# Patient Record
Sex: FEMALE | Race: BLACK OR AFRICAN AMERICAN | NOT HISPANIC OR LATINO
[De-identification: names, ages, dates, MRNs, and addresses within clinical notes are randomized per-mention and may not be internally consistent; named-entity substitution may affect disease eponyms.]

---

## 2019-05-22 PROBLEM — Z00.00 ENCOUNTER FOR PREVENTIVE HEALTH EXAMINATION: Status: ACTIVE | Noted: 2019-05-22

## 2019-06-07 ENCOUNTER — OTHER (OUTPATIENT)
Age: 59
End: 2019-06-07

## 2019-06-13 ENCOUNTER — APPOINTMENT (OUTPATIENT)
Dept: ORTHOPEDIC SURGERY | Facility: CLINIC | Age: 59
End: 2019-06-13
Payer: COMMERCIAL

## 2019-06-13 VITALS
SYSTOLIC BLOOD PRESSURE: 156 MMHG | DIASTOLIC BLOOD PRESSURE: 87 MMHG | WEIGHT: 260 LBS | BODY MASS INDEX: 44.39 KG/M2 | HEIGHT: 64 IN | HEART RATE: 61 BPM

## 2019-06-13 DIAGNOSIS — Z78.9 OTHER SPECIFIED HEALTH STATUS: ICD-10-CM

## 2019-06-13 DIAGNOSIS — Z87.39 PERSONAL HISTORY OF OTHER DISEASES OF THE MUSCULOSKELETAL SYSTEM AND CONNECTIVE TISSUE: ICD-10-CM

## 2019-06-13 PROCEDURE — 73564 X-RAY EXAM KNEE 4 OR MORE: CPT

## 2019-06-13 PROCEDURE — 73502 X-RAY EXAM HIP UNI 2-3 VIEWS: CPT

## 2019-06-13 PROCEDURE — 99204 OFFICE O/P NEW MOD 45 MIN: CPT

## 2019-06-13 PROCEDURE — 99203 OFFICE O/P NEW LOW 30 MIN: CPT

## 2019-06-13 NOTE — DISCUSSION/SUMMARY
[de-identified] : The patient is a 59 year old female with moderate arthritis of the left hip and bone on bone arthritis of the left knee. Conservative options were discussed. Visco Supplementation was ordered. She may be a future candidate for left knee replacement and ultimately left hip replacement, but wishes to continue with conservative treatment. She will follow up for Visco Supplementation.\par \par A discussion was had with the patient regarding a left total knee replacement. A long discussion was had with the patient as what the total joint replacement would entail. A model was used to demonstrate the operation and to discuss bearing surfaces of the implants. The hospitalization and rehabilitation were discussed.  The use of perioperative antibiotics and DVT prophylaxis were discussed. The risks, benefits and alternatives to surgical intervention were discussed at length with the patient. Specific risks discussed included: infection, wound breakdown, numbness and damage to nerves, tendon, muscle, arteries or other blood vessels. The possibility of recurrent pain, no improvement in pain and actual worsening of the pain were also mentioned in conversation with the patient. Medical complications related to the patient's general medical health including deep vein thrombosis, pulmonary embolus, heart attack, stroke, death and other complications from anesthesia were discussed as well. The patient was told that we will take steps to minimize these risks by using sterile technique, antibiotics and DVT prophylaxis when appropriate and following the patient postoperatively in the clinic setting to monitor progress. The benefits of surgery were discussed with the patient including the potential to improve the current clinical condition through operative intervention. Alternatives to surgical intervention include continued conservative management which may yield less than optimal results in this particular patient. All questions were answered to the satisfaction of the patient.\par

## 2019-06-13 NOTE — HISTORY OF PRESENT ILLNESS
[de-identified] : This 59-year-old female presents for evaluation of her left hip and knee. She denies overt injury or trauma. She reports moving in 2017.   After the move she became aware of pain to her left hip and knees. She was originally seen by another orthopedist. She was diagnosed with osteoarthritis. In May of 2018 she had bilateral knee Visco supplementation with good relief for approximately 1 year. In addition she had an intra-articular cortisone injection to the left hip which also offered prolonged relief for approximately 1 year. At this time she is noting left hip pain. Pain is most notable over the posterior lateral aspect of the hip. She is denying groin pain at this time. She does have difficulty donning/doffing shoes and socks. Symptoms are worse in inclement and cold weather. She also notes bilateral knee pain, left greater than right. Pain is constant. It is worse with weightbearing activities. She is limping. She was seen by her primary medical doctor and given naproxen. She also takes arthritis strength Tylenol.

## 2019-06-13 NOTE — PHYSICAL EXAM
[de-identified] : The patient appears well nourished  and in no apparent distress.  The patient is alert and oriented to person, place, and time.   Affect and mood appear normal. The head is normocephalic and atraumatic.  The eyes reveal normal sclera and extra ocular muscles are intact. The tongue is midline with no apparent lesions.  Skin shows normal turgor with no evidence of eczema or psoriasis.  No respiratory distress noted.  Sensation grossly intact.\par   [de-identified] : Exam of the left knee shows -5 to 90 degrees of flexion with pain and stiffness.\par Exam of the left hip shows external rotation of 50 degrees, internal rotation is -10 degrees, left hip pain with ROM. 5/5 motor strength bilaterally distally. Sensation intact distally.  [de-identified] : Xray- AP pelvis and 2 views left hip shows moderate arthritis of the left hip.\par \par Xray- 4 views of the left knee shows bone on bone arthritis of the left knee.

## 2019-06-13 NOTE — CONSULT LETTER
[Consult Letter:] : I had the pleasure of evaluating your patient, [unfilled]. [Dear  ___] : Dear  [unfilled], [Please see my note below.] : Please see my note below. [Consult Closing:] : Thank you very much for allowing me to participate in the care of this patient.  If you have any questions, please do not hesitate to contact me. [Sincerely,] : Sincerely, [FreeTextEntry2] : ENRIQUE JOSEPH MD\par  [FreeTextEntry3] : Justo Bobo MD\par Chief of Joint Replacement\par [facility] \par [facility] [place]\par \par

## 2019-06-13 NOTE — ADDENDUM
[FreeTextEntry1] : This note was authored by Emil Desai working as a medical scribe for Dr. Justo Bobo. The note was reviewed, edited, and revised by Dr. Justo Bobo whom is in agreement with the exam findings, imaging findings, and treatment plan. 06/13/2019.

## 2019-07-25 ENCOUNTER — OTHER (OUTPATIENT)
Age: 59
End: 2019-07-25

## 2019-07-31 ENCOUNTER — OTHER (OUTPATIENT)
Age: 59
End: 2019-07-31

## 2019-08-09 ENCOUNTER — OTHER (OUTPATIENT)
Age: 59
End: 2019-08-09

## 2019-08-14 ENCOUNTER — RX RENEWAL (OUTPATIENT)
Age: 59
End: 2019-08-14

## 2019-08-15 ENCOUNTER — OTHER (OUTPATIENT)
Age: 59
End: 2019-08-15

## 2019-08-15 ENCOUNTER — RX RENEWAL (OUTPATIENT)
Age: 59
End: 2019-08-15

## 2019-08-26 ENCOUNTER — FORM ENCOUNTER (OUTPATIENT)
Age: 59
End: 2019-08-26

## 2019-08-27 ENCOUNTER — APPOINTMENT (OUTPATIENT)
Dept: ULTRASOUND IMAGING | Facility: CLINIC | Age: 59
End: 2019-08-27

## 2019-08-27 ENCOUNTER — OUTPATIENT (OUTPATIENT)
Dept: OUTPATIENT SERVICES | Facility: HOSPITAL | Age: 59
LOS: 1 days | End: 2019-08-27

## 2019-08-27 ENCOUNTER — APPOINTMENT (OUTPATIENT)
Dept: ULTRASOUND IMAGING | Facility: CLINIC | Age: 59
End: 2019-08-27
Payer: COMMERCIAL

## 2019-08-27 PROCEDURE — 20611 DRAIN/INJ JOINT/BURSA W/US: CPT | Mod: LT

## 2020-03-26 ENCOUNTER — APPOINTMENT (OUTPATIENT)
Dept: ORTHOPEDIC SURGERY | Facility: CLINIC | Age: 60
End: 2020-03-26

## 2020-04-23 ENCOUNTER — APPOINTMENT (OUTPATIENT)
Dept: ORTHOPEDIC SURGERY | Facility: CLINIC | Age: 60
End: 2020-04-23

## 2020-04-28 ENCOUNTER — APPOINTMENT (OUTPATIENT)
Dept: ORTHOPEDIC SURGERY | Facility: CLINIC | Age: 60
End: 2020-04-28

## 2020-05-15 ENCOUNTER — APPOINTMENT (OUTPATIENT)
Dept: ORTHOPEDIC SURGERY | Facility: CLINIC | Age: 60
End: 2020-05-15
Payer: MEDICAID

## 2020-05-15 PROCEDURE — 99213 OFFICE O/P EST LOW 20 MIN: CPT | Mod: 95

## 2020-05-18 ENCOUNTER — APPOINTMENT (OUTPATIENT)
Dept: ORTHOPEDIC SURGERY | Facility: CLINIC | Age: 60
End: 2020-05-18
Payer: MEDICAID

## 2020-05-18 PROCEDURE — 73564 X-RAY EXAM KNEE 4 OR MORE: CPT | Mod: LT,RT

## 2020-05-18 RX ORDER — VALSARTAN AND HYDROCHLOROTHIAZIDE 160; 12.5 MG/1; MG/1
160-12.5 TABLET, FILM COATED ORAL
Qty: 90 | Refills: 0 | Status: ACTIVE | COMMUNITY
Start: 2020-03-24

## 2020-05-18 RX ORDER — ATENOLOL 50 MG/1
50 TABLET ORAL
Qty: 90 | Refills: 0 | Status: ACTIVE | COMMUNITY
Start: 2020-03-17

## 2020-05-18 NOTE — PHYSICAL EXAM
[de-identified] : The patient appears well nourished  and in no apparent distress.  The patient is alert and oriented to person, place, and time.   Affect and mood appear normal. The head is normocephalic and atraumatic. \par   [de-identified] : Exam of the left knee shows pain with knee ROM.\par Exam of the right knee shows pain with knee ROM. She is ambulating with an antalgic gait. [de-identified] : X-ray 4 views of both knees May 18, 2020 shows severe bone-on-bone tricompartmental arthritis of both knees.

## 2020-05-18 NOTE — ADDENDUM
[FreeTextEntry1] : This note was authored by Emil Desai working as a medical scribe for Dr. Justo Bobo. The note was reviewed, edited, and revised by Dr. Justo Bobo whom is in agreement with the exam findings, imaging findings, and treatment plan. 05/15/2020.

## 2020-05-18 NOTE — DISCUSSION/SUMMARY
[de-identified] : The patient is a 60 year old female with bone on bone arthritis of the left knee and probable osteoarthritis of the right knee. Conservative options were discussed. She was recommended to come in on Monday of next week for Xray's of her bilateral knees. We discussed ordering Visco Supplementation once reviewing the results of the Xray's.

## 2020-05-18 NOTE — HISTORY OF PRESENT ILLNESS
[Home] : at home, [unfilled] , at the time of the visit. [Medical Office: ([facility])___] : at the medical office located in  [de-identified] : The patient is a 60 year old female being seen for evaluation of her bilateral knees. She has advanced osteoarthritis of the left knee. Last seen in the office in June of 2019 at which time Visco Supplementation was ordered. She reports never undergoing Visco Supplementation. She is ambulating and transferring with pain and stiffness. She reports pain in both knees (left greater than right). She reports her bilateral knee pain is global. Pain is worse with transferring and weightbearing activities, most especially stair climbing. She is being seen today via telehealth for repeat evaluation and for further treatment options.

## 2020-05-29 ENCOUNTER — TRANSCRIPTION ENCOUNTER (OUTPATIENT)
Age: 60
End: 2020-05-29

## 2020-06-19 ENCOUNTER — APPOINTMENT (OUTPATIENT)
Dept: ORTHOPEDIC SURGERY | Facility: CLINIC | Age: 60
End: 2020-06-19
Payer: MEDICAID

## 2020-06-19 VITALS
HEIGHT: 64 IN | BODY MASS INDEX: 44.39 KG/M2 | HEART RATE: 61 BPM | DIASTOLIC BLOOD PRESSURE: 81 MMHG | SYSTOLIC BLOOD PRESSURE: 134 MMHG | WEIGHT: 260 LBS

## 2020-06-19 PROCEDURE — 99213 OFFICE O/P EST LOW 20 MIN: CPT

## 2020-06-19 RX ORDER — OLMESARTAN MEDOXOMIL 40 MG/1
TABLET, FILM COATED ORAL
Refills: 0 | Status: COMPLETED | COMMUNITY
End: 2020-06-19

## 2020-06-19 RX ORDER — NAPROXEN 500 MG/1
TABLET ORAL
Refills: 0 | Status: COMPLETED | COMMUNITY
End: 2020-06-19

## 2020-06-19 NOTE — REVIEW OF SYSTEMS
[Joint Pain] : joint pain [Joint Stiffness] : joint stiffness [FreeTextEntry9] : left hip and both knees

## 2020-06-19 NOTE — PHYSICAL EXAM
[de-identified] : Exam of the left hip shows pain limits exam of the left hip.\par Exam of the left knee shows pain with with -5 to 90 degrees of flexion.\par Exam of the right knee shows -10 to 100 degrees of flexion with severe pain. 5/5 motor strength bilaterally distally. Sensation intact distally.  [de-identified] : The patient appears well nourished  and in no apparent distress.  The patient is alert and oriented to person, place, and time.   Affect and mood appear normal. The head is normocephalic and atraumatic.  The eyes reveal normal sclera and extra ocular muscles are intact. The tongue is midline with no apparent lesions.  Skin shows normal turgor with no evidence of eczema or psoriasis.  No respiratory distress noted.  Sensation grossly intact.\par

## 2020-06-19 NOTE — HISTORY OF PRESENT ILLNESS
[de-identified] : LINA is a 60 year old female who presents today for followup evaluation of left hip and bilateral knee pain.  Patient says she has years of bilateral knee pain and left hip pain but now is worsening over the last few months.  She has not had any falls or trauma.  Of the knees patient localizes the pain medially and anteriorly.  The hip pain is localized to the left groin as well as the posterior lateral aspect of the left hip.  Her pain is exacerbated by weightbearing activity including walking any distance standing for long periods of time and going up and down the stairs.  In the past patient has received a image guided intra-articular injection to the left hip with cortisone and local anesthetic that was approximately 1 year ago.  Approximately 2 years ago she had gel injections to the bilateral knees which worked temporarily.  Patient was last evaluated via a telehealth visit on May 15, 2020 and had discussed Visco supplementation.  Patient was informed to follow-up for x-rays before ordering the Visco supplementation. She presents today for follow up. \par

## 2020-06-19 NOTE — DISCUSSION/SUMMARY
[de-identified] : The patient is a 60 year old female with bone on bone tricompartmental arthritis of both knees and moderate arthritis of the left hip. Conservative options were discussed. She was sent for an ultrasound-guided cortisone injection in the left hip. Visco Supplementation was previously order and is waiting on patients consent to be distributed to the office. She will follow up for Visco Supplementation.

## 2020-06-19 NOTE — ADDENDUM
[FreeTextEntry1] : This note was authored by Emil Desai working as a medical scribe for Dr. Justo Bobo. The note was reviewed, edited, and revised by Dr. Justo Bobo whom is in agreement with the exam findings, imaging findings, and treatment plan. 06/19/2020.

## 2020-07-09 ENCOUNTER — APPOINTMENT (OUTPATIENT)
Age: 60
End: 2020-07-09
Payer: MEDICAID

## 2020-07-09 VITALS — WEIGHT: 260 LBS | BODY MASS INDEX: 44.39 KG/M2 | HEIGHT: 64 IN

## 2020-07-09 PROCEDURE — 20610 DRAIN/INJ JOINT/BURSA W/O US: CPT | Mod: RT

## 2020-07-09 PROCEDURE — 99213 OFFICE O/P EST LOW 20 MIN: CPT | Mod: 25

## 2020-07-09 RX ORDER — HYALURONATE SOD, CROSS-LINKED 30 MG/3 ML
30 SYRINGE (ML) INTRAARTICULAR
Qty: 1 | Refills: 0 | Status: DISCONTINUED | OUTPATIENT
Start: 2019-07-31 | End: 2020-07-09

## 2020-07-09 RX ORDER — ATENOLOL 100 MG/1
100 TABLET ORAL
Refills: 0 | Status: DISCONTINUED | COMMUNITY
End: 2020-07-09

## 2020-07-09 RX ORDER — HYALURONATE SODIUM 20 MG/2 ML
20 SYRINGE (ML) INTRAARTICULAR
Qty: 2 | Refills: 0 | Status: DISCONTINUED | OUTPATIENT
Start: 2020-05-15 | End: 2020-07-09

## 2020-07-09 RX ORDER — HYALURONATE SOD, CROSS-LINKED 30 MG/3 ML
30 SYRINGE (ML) INTRAARTICULAR
Qty: 1 | Refills: 0 | Status: DISCONTINUED | OUTPATIENT
Start: 2019-07-25 | End: 2020-07-09

## 2020-07-09 RX ORDER — HYALURONATE SODIUM 20 MG/2 ML
20 SYRINGE (ML) INTRAARTICULAR
Qty: 1 | Refills: 0 | Status: DISCONTINUED | OUTPATIENT
Start: 2019-06-13 | End: 2020-07-09

## 2020-07-09 NOTE — PHYSICAL EXAM
[de-identified] : The patient appears well nourished  and in no apparent distress.  The patient is alert and oriented to person, place, and time.   Affect and mood appear normal. The head is normocephalic and atraumatic.  The eyes reveal normal sclera and extra ocular muscles are intact. The tongue is midline with no apparent lesions.  Skin shows normal turgor with no evidence of eczema or psoriasis.  No respiratory distress noted.  Sensation grossly intact.\par   [de-identified] : Exam of the left hip shows pain limits exam of the left hip.\par Exam of the left knee shows pain with with -5 to 90 degrees of flexion.\par Exam of the right knee shows -10 to 100 degrees of flexion with severe pain. 5/5 motor strength bilaterally distally. Sensation intact distally.

## 2020-07-09 NOTE — REASON FOR VISIT
[Follow-Up Visit] : a follow-up visit for [Other: ____] : [unfilled] [FreeTextEntry2] : bilateral Euflexxa injection #1. Lot# [id number], Expires on 2021/03/07.

## 2020-07-09 NOTE — HISTORY OF PRESENT ILLNESS
[de-identified] : Patient is a 60-year-old female presenting for follow-up of bilateral knee pain.  Patient was last seen in office on June 19, 2020 with osteoarthritis symptoms of the bilateral knees.  Today she has the same symptoms, knee pain localized medially worse with weightbearing activity. Patient also admits to intermittent swelling of the knees. Patient was indicated for gel injections to the bilateral knees and presents today for first injection.\par \par The patient is here today for a Euflexxa injection for the Bilateral knees. The patient is having osteoarthritic symptoms. The patient was seen previously and was indicated for Euflexxa injections.\par Allergies: The patient denies allergies to medications and has no allergies to chicken,eggs, or feathers.\par Procedure: The patient has been identified by name and date of birth. Patient confirms that we are treating the Bilateral knees today.\par The knees were prepped in the usual sterile fashion. The areas were cleansed with chlorhexadine, then sprayed with ethyl chloride. The patient was then injected with the Euflexxa into the bilateral knees. The patient tolerated the procedure well. The medication was delivered aseptically and atraumatically.\par Diagnosis: Osteoarthritis of the bilateral knees\par Treatment: The patient was advised on the activities for today. I gave the patient instructions on postinjection ice and analgesia.\par Follow up recommended in one week for next injection.

## 2020-07-14 ENCOUNTER — OUTPATIENT (OUTPATIENT)
Dept: OUTPATIENT SERVICES | Facility: HOSPITAL | Age: 60
LOS: 1 days | End: 2020-07-14

## 2020-07-14 ENCOUNTER — APPOINTMENT (OUTPATIENT)
Dept: ULTRASOUND IMAGING | Facility: CLINIC | Age: 60
End: 2020-07-14
Payer: MEDICAID

## 2020-07-14 DIAGNOSIS — M16.12 UNILATERAL PRIMARY OSTEOARTHRITIS, LEFT HIP: ICD-10-CM

## 2020-07-14 PROCEDURE — 20611 DRAIN/INJ JOINT/BURSA W/US: CPT | Mod: LT

## 2020-07-17 ENCOUNTER — APPOINTMENT (OUTPATIENT)
Dept: ORTHOPEDIC SURGERY | Facility: CLINIC | Age: 60
End: 2020-07-17
Payer: MEDICAID

## 2020-07-17 VITALS
DIASTOLIC BLOOD PRESSURE: 81 MMHG | BODY MASS INDEX: 44.39 KG/M2 | WEIGHT: 260 LBS | HEIGHT: 64 IN | HEART RATE: 60 BPM | SYSTOLIC BLOOD PRESSURE: 129 MMHG

## 2020-07-17 PROCEDURE — 20610 DRAIN/INJ JOINT/BURSA W/O US: CPT | Mod: LT

## 2020-07-17 NOTE — HISTORY OF PRESENT ILLNESS
[de-identified] : The patient is here today for the second Euflexxa injection for the Bilateral knees. The patient is having osteoarthritic symptoms. \par Allergies: The patient denies allergies to medications and has no allergies to chicken,eggs, or feathers.\par Procedure: The patient has been identified by name and date of birth. Patient confirms that we are treating the Bilateral knees today.\par The knees were prepped in the usual sterile fashion. The areas were cleansed with chlorhexadine, then sprayed with ethyl chloride. The patient was then injected with the Euflexxa into the bilateral knees. The patient tolerated the procedure well. The medication was delivered aseptically and atraumatically.\par Diagnosis: Osteoarthritis of the bilateral knees\par Treatment: The patient was advised on the activities for today. I gave the patient instructions on postinjection ice and analgesia.\par Follow up recommended in one week for next injection.

## 2020-07-17 NOTE — REASON FOR VISIT
[Follow-Up Visit] : a follow-up visit for [FreeTextEntry2] : Bilateral knee pain. Euflexxa bilateral knee injection #2. Lot# [id number], Expires on 2021/03/07.

## 2020-07-24 ENCOUNTER — APPOINTMENT (OUTPATIENT)
Dept: ORTHOPEDIC SURGERY | Facility: CLINIC | Age: 60
End: 2020-07-24
Payer: MEDICAID

## 2020-07-24 VITALS — WEIGHT: 260 LBS | HEIGHT: 64 IN | BODY MASS INDEX: 44.39 KG/M2

## 2020-07-24 PROCEDURE — 20610 DRAIN/INJ JOINT/BURSA W/O US: CPT | Mod: LT

## 2020-07-24 NOTE — HISTORY OF PRESENT ILLNESS
[de-identified] : The patient is here today for the third Euflexxa injection for the Bilateral knees. The patient is having osteoarthritic symptoms. \par Allergies: The patient denies allergies to medications and has no allergies to chicken,eggs, or feathers.\par Procedure: The patient has been identified by name and date of birth. Patient confirms that we are treating the Bilateral knees today.\par The knees were prepped in the usual sterile fashion. The areas were cleansed with chlorhexadine, then sprayed with ethyl chloride. The patient was then injected with the Euflexxa into the bilateral knees. The patient tolerated the procedure well. The medication was delivered aseptically and atraumatically.\par Diagnosis: Osteoarthritis of the bilateral knees\par Treatment: The patient was advised on the activities for today. I gave the patient instructions on postinjection ice and analgesia.\par Follow up recommended in 6 weeks.

## 2020-07-24 NOTE — REASON FOR VISIT
[Follow-Up Visit] : a follow-up visit for [Other: ____] : [unfilled] [FreeTextEntry2] : Euflexxa bilateral knee injection #3. Lot# [id number], Expires on 2021/03/07. \par

## 2020-08-14 ENCOUNTER — APPOINTMENT (OUTPATIENT)
Dept: ORTHOPEDIC SURGERY | Facility: CLINIC | Age: 60
End: 2020-08-14

## 2020-08-14 VITALS
SYSTOLIC BLOOD PRESSURE: 127 MMHG | HEIGHT: 64 IN | HEART RATE: 63 BPM | WEIGHT: 215 LBS | BODY MASS INDEX: 36.7 KG/M2 | DIASTOLIC BLOOD PRESSURE: 78 MMHG

## 2020-08-14 VITALS — TEMPERATURE: 96.6 F

## 2020-08-18 NOTE — REASON FOR VISIT
[Initial Visit] : an initial visit for [Knee Pain] : knee pain [FreeTextEntry2] : Bilateral Knee pain. Left hip pain

## 2020-08-28 ENCOUNTER — APPOINTMENT (OUTPATIENT)
Dept: ORTHOPEDIC SURGERY | Facility: CLINIC | Age: 60
End: 2020-08-28
Payer: MEDICAID

## 2020-08-28 VITALS
DIASTOLIC BLOOD PRESSURE: 77 MMHG | WEIGHT: 215 LBS | BODY MASS INDEX: 36.7 KG/M2 | HEIGHT: 64 IN | HEART RATE: 65 BPM | SYSTOLIC BLOOD PRESSURE: 116 MMHG

## 2020-08-28 VITALS — TEMPERATURE: 96.7 F

## 2020-08-28 PROCEDURE — 99214 OFFICE O/P EST MOD 30 MIN: CPT

## 2020-08-28 NOTE — REASON FOR VISIT
[Initial Visit] : an initial visit for [FreeTextEntry2] : bilateral knee pain as well as left hip pain

## 2020-08-28 NOTE — HISTORY OF PRESENT ILLNESS
[de-identified] : Patient is a 60-year-old female who presents today for an evaluation of both knees and left hip. She states she's had pain for many years it is progressively getting worse. She has been followed with Dr. Weathers until recently do to insurance purposes. She states that she's been treated conservatively with viscous supplementation injections in both knees as well as a intra-articular cortisone injection in the left hip he was explained that she does have osteoarthritic changes and explained the modalities of treatment with these injections she states that she also does a good exercise program. She states that these injections do keep her in check with less pain and discomfort. However at times the pain he be more pronounced. Her last series of Visco supplementation injections were done in July. And the left hip which was on effective in June. She presents today for evaluation regarding both knees and her left hip [Stable] : stable

## 2020-08-28 NOTE — DISCUSSION/SUMMARY
[de-identified] : After a full history or examination and review of x-rays. It was applied to the patient that she does suffer from severe DJD of both the left and right knee. She wasn't going to do for modalities of treatment which include conservative measures versus surgical intervention. She was explained that in the foreseeable future she will require bilateral total total knee replacement. However she is doing well with her Visco supplementation injections. Since she last received her injection only one month ago. She is advised to follow with her current management. Which include ice moist heat and anti-inflammatories when needed. And to return back to the office once the Visco supplementation injections over his course and the pain returns. In regards to the left hip. She wasn't limited she does have some mild osteoarthritic changes but that much of her discomfort is coming from her lower back. Therefore it is recommended that she follow with a back specialist. Patient will return back to the office in another month or 2 for reevaluation.

## 2020-08-28 NOTE — PHYSICAL EXAM
[Normal] : Gait: normal [LE] : Sensory: Intact in bilateral lower extremities [ALL] : dorsalis pedis, posterior tibial, femoral, popliteal, and radial 2+ and symmetric bilaterally [de-identified] : On physical examination of both the left and right knee. The skin is clean dry and intact with no areas of redness swelling or heat noted. The overall alignment is within neutral position. There is pain and tenderness noted in all 3 compartments primarily patellofemoral as well as medial femoral tibial compartment and she has excellent range of motion. No evidence of motor or sensory deficit. No evidence of any muscle atrophy. No evidence of any ligamentous laxity. Patient has good distal pulses with no calf tenderness.\par \par On physical examination of the left hip. The skin is clean dry and intact with no areas of redness swelling or heat noted. There is a mild diffuse pain and tenderness on the lateral aspect which does radiate towards her groin however she complains of more pain in the posterior aspect. When examined this is actually from her lower back at all on the left pain is spinal muscles. This can be tracked down towards the left buttocks and distally with a Tinel's test in her hip. She does have good range of motion. In all planes. There is no evidence of ligament discrepancy. No evidence of any muscle atrophy. No evidence of any motor or sensory deficit. [de-identified] : X-rays of both knees reveal severe osteoarthritic changes in all 3 compartments. There is narrowing of the joint space and apparently in the patellofemoral as well as the medial femoral tibia compartments of both the left and right knee. There is marked osteophyte formation as well as sclerosis. There is no evidence of any fracture or dislocation.\par \par X-rays of the left hip show some mild osteoarthritic changes with some mild narrowing. There is no evidence of any fracture or dislocation.

## 2020-09-11 ENCOUNTER — APPOINTMENT (OUTPATIENT)
Dept: ORTHOPEDIC SURGERY | Facility: CLINIC | Age: 60
End: 2020-09-11

## 2020-09-22 ENCOUNTER — APPOINTMENT (OUTPATIENT)
Dept: ORTHOPEDIC SURGERY | Facility: CLINIC | Age: 60
End: 2020-09-22
Payer: MEDICAID

## 2020-09-22 VITALS
DIASTOLIC BLOOD PRESSURE: 85 MMHG | BODY MASS INDEX: 36.88 KG/M2 | SYSTOLIC BLOOD PRESSURE: 142 MMHG | HEIGHT: 64 IN | HEART RATE: 68 BPM | WEIGHT: 216 LBS

## 2020-09-22 DIAGNOSIS — M25.559 PAIN IN UNSPECIFIED HIP: ICD-10-CM

## 2020-09-22 DIAGNOSIS — Z86.79 PERSONAL HISTORY OF OTHER DISEASES OF THE CIRCULATORY SYSTEM: ICD-10-CM

## 2020-09-22 DIAGNOSIS — E66.9 OBESITY, UNSPECIFIED: ICD-10-CM

## 2020-09-22 DIAGNOSIS — M47.816 SPONDYLOSIS W/OUT MYELOPATHY OR RADICULOPATHY, LUMBAR REGION: ICD-10-CM

## 2020-09-22 DIAGNOSIS — Z80.3 FAMILY HISTORY OF MALIGNANT NEOPLASM OF BREAST: ICD-10-CM

## 2020-09-22 PROCEDURE — 72100 X-RAY EXAM L-S SPINE 2/3 VWS: CPT

## 2020-09-22 PROCEDURE — 99215 OFFICE O/P EST HI 40 MIN: CPT

## 2020-09-22 NOTE — DISCUSSION/SUMMARY
[de-identified] : Patient's condition: Physical therapy for soft tissue modalities and core strengthening. I continue a topical anti-inflammatories and topical modalities. If patient's low back pain is still persistent despite physical therapy patient can be followed up again MRI will be obtained for additional recommendations such as injection therapy etc.\par \par Patient with multiple medical comorbidity increasing the risk of perioperative and postoperative complications as well as diminished spine outcomes as per the current medical literature. These include but are not limited to obesity, anxiety/depression, cardiac illness, kidney disease, peripheral vascular disease, history of cancer, COPD, dysmetabolic syndrome including but not limited to diabetes, hyperlipidemia, hypertension. Patient is being referred back to primary care provider for medical optimization, as well as other appropriate specialists as needed for optimization prior to spine surgery. \par

## 2020-09-22 NOTE — HISTORY OF PRESENT ILLNESS
[Worsening] : worsening [10] : a maximum pain level of 10/10 [Bending] : worsened by bending [Lifting] : worsened by lifting [Walking] : worsened by walking [Rest] : relieved by rest [de-identified] : Very pleasant 60-year-old female Presents for evaluation of low back pain. Patient has a history of bilateral knee degenerative joint disease as well as moderate left hip arthritis. Patient states the pain in her left lower lumbar spine his mom more mechanically orientated improves with topical modalities anti-inflammatories and rest. Patient has no neurologic complaints her JAVIER questionnaire is negative. [Ataxia] : no ataxia [Incontinence] : no incontinence [Loss of Dexterity] : good dexterity [Urinary Ret.] : no urinary retention

## 2020-09-22 NOTE — PHYSICAL EXAM
[de-identified] : CONSTITUTIONAL: The patient is a very pleasant individual who is well-nourished and who appears stated age.\par PSYCHIATRIC: The patient is alert and oriented X 3 and in no apparent distress, and participates with orthopedic evaluation well.\par HEAD: Atraumatic and is nonsyndromic in appearance.\par EENT: No visible thyromegaly, EOMI.\par RESPIRATORY: Respiratory rate is regular, not dyspneic on examination.\par LYMPHATICS: There is no inguinal lymphadenopathy\par INTEGUMENTARY: Skin is clean, dry, and intact about the bilateral lower extremities and lumbar spine.\par VASCULAR: There is brisk capillary refill about the bilateral lower extremities.\par NEUROLOGIC: There are no pathologic reflexes. There is no decrease in sensation of the bilateral lower extremities on Wartenberg pinwheel/manual examination. Deep tendon reflexes are well maintained at 2+/4 of the bilateral lower extremities and are symmetric..\par MUSCULOSKELETAL: There is no visible muscular atrophy. Manual motor strength is well maintained in the bilateral lower extremities. Range of motion of lumbar spine is well maintained. The patient ambulates in a non-myelopathic manner. Negative tension sign and straight leg raise bilaterally. Quad extension, ankle dorsiflexion, EHL, plantar flexion, and ankle eversion are well preserved. Normal secondary orthopaedic exam of bilateral hips, greater trochanteric area, knees and ankles\par Mechanically I indicated low back pain. [de-identified] : Lumbar x-ray done today demonstrates a well-preserved lumbar lordosis. Spondylosis Decrease intervertebral disc space L5-S1. Grade\par Subtle spondylolisthesis L4-L5. Mild age-appropriate lumbar degenerative disc disease.\par \par hip and knee x-rays have also been reviewed that shows moderateto severe degenerative joint disease within the knee as well as the left hip

## 2021-01-08 ENCOUNTER — APPOINTMENT (OUTPATIENT)
Dept: ORTHOPEDIC SURGERY | Facility: CLINIC | Age: 61
End: 2021-01-08
Payer: MEDICAID

## 2021-01-08 VITALS
DIASTOLIC BLOOD PRESSURE: 70 MMHG | HEART RATE: 61 BPM | SYSTOLIC BLOOD PRESSURE: 118 MMHG | BODY MASS INDEX: 36.88 KG/M2 | WEIGHT: 216 LBS | HEIGHT: 64 IN

## 2021-01-08 DIAGNOSIS — M25.552 PAIN IN LEFT HIP: ICD-10-CM

## 2021-01-08 DIAGNOSIS — M70.62 TROCHANTERIC BURSITIS, LEFT HIP: ICD-10-CM

## 2021-01-08 PROCEDURE — 99072 ADDL SUPL MATRL&STAF TM PHE: CPT

## 2021-01-08 PROCEDURE — 99214 OFFICE O/P EST MOD 30 MIN: CPT

## 2021-01-08 RX ORDER — DICLOFENAC SODIUM 75 MG/1
75 TABLET, DELAYED RELEASE ORAL
Qty: 30 | Refills: 1 | Status: ACTIVE | COMMUNITY
Start: 2021-01-08 | End: 1900-01-01

## 2021-01-11 NOTE — REVIEW OF SYSTEMS
[Joint Pain] : no joint pain [Joint Stiffness] : no joint stiffness [Negative] : Heme/Lymph [FreeTextEntry9] : Dr. Bobo - knee, hip; Dr. Henderson - Lumbar spine

## 2021-01-11 NOTE — REASON FOR VISIT
[Follow-Up Visit] : a follow-up visit for [FreeTextEntry2] : bilateral knee as well as left hip injection

## 2021-01-11 NOTE — HISTORY OF PRESENT ILLNESS
[de-identified] : Existing patient, she presents for re-evaluation of her LEft knee and Left hip. LAst OV 8/28/20. She has established severe DJD of both knees indicating TKR and has moderate to severe LEft hip OA. \par Left hip: has lateral GT area left hip pain with stair and chair use x past 6 mos. also associated with distance walking and lying directly on it. She had US guided intra-articular steroid injection left hip 8/27/19 ordered by Dr. Bobo with 3-4 mos. of partial relief afterward. HAd OP PT 1 course.\par LEft KNee: has mid and anterior Left knee pain with walking an ADLs worsening over 6 mos, present x 2+ Yrs. Had course of Euflexxa 6-7/2020 with mild partial relief Dr. Bobo. Had a;so [id number] LEft knee 2019 with miln partial relief at that time. Had OP PT courses 2019 & 2020. Had Rx Dilclofenac  2020 with mild - mod relief. Declined offered TKR 2020.\par Location: LEft hip and LEft knee\par Duration: Total:3Y; Recent: 6 mos\par Severity: moderate 0hip moderate to severe knee\par Timing: daily, nightly\par Context: ongoing & worsening\par Modifying factors: walking, standing, stair use, chair use\par Limp:intermittent\par Assistive device: 0 used\par Difficulties: walking no > 2-3 blocks, stair use, chair use, dressing LEft shoes & socks, left knee buckling 1-2x/month.\par Other Tx:  As above\par \par

## 2021-01-11 NOTE — DISCUSSION/SUMMARY
[de-identified] : Dr. Ferro evaluated this patient, reviewed the radiographs, and is guiding the patient's orthopedic management. \par IN terns if her hip she was advised that she has an acute Trochanteric Bursitis of the LEft hip and moderate to sever LEft hip DJD with mild to moderate mechanical deficiency at this moment. The spectrum of treatments for Trochanteric Bursitis was reviewed in detail. She would benefit from\par a US guided steroid inject of the Trochanteric bursa in a Radiology suite. She would also benefit from a short course of Diclofenac as interim pain management, which was also her request.\par IN terms of her LEft knee she was advised that she has severe DJD of that knee with instability. The spectrum of Treatments for knee DJ was reviewed in detail. She opted for another course of Visco-supplementation considering her last response. A Rx for Euflexxa was performed which will require insurance authorization. THe limitations of knee injections was reviewed as well.\par She was also given a Rx for OP PT LE  modalities to start after hip injection.   \par Return to office for injection and 6=8 week F/U .

## 2021-01-11 NOTE — PHYSICAL EXAM
[de-identified] : General/Constitutional: Well appearing, obese 60 year old F in no apparent distress; height and weight as detailed below; vital signs as detailed below\par Neuro:\par Orientation/Mental Status: Awake, alert, oriented to time, place, & person.\par Balance: Single leg balance fair - poor on Left / Right @ 10 sec.\par Sensation: intact to fine & deep touch bilat. Feet/toes; \par EHL/AT(Peroneal N.): Bilat: 5/5 \par \par Vascular: DP: Bilat: 2/3\par Cap refill 1-2 sec. all toes\par Calves non tender bilat; No John's Sx Bilat.\par Lymph: No enlarged LN in the popliteal chain bilaterally.\par Skin(LE): No cellulitis, edema, and min. venous varicosities bilaterally \par MS:\par Gait: \par [ Left  ] gonalgic limp using no AD..\par Function: There is min. / mod. difficulty mounting the exam table. \par \par Khanh sign:  No\par Trendelenberg sign:  No\par There is [ no  ] rotatory positioning of the pelvis \par The [LEft ] hip has moderate & reproducible tenderness in the trochanteric bursa; NOne in the groin.\par \par Left Hip ROM:\par SLR= 20 deg.; Flexion= 95 deg.; Extension= 0 deg.; Ext. Rot.= 40 deg.;\par Int. Rot.= 0 deg.; Abduction= 35 deg.; Adduction= 20 deg.\par \par Right  Hip ROM:\par SLR= 50 deg.; Flexion= 100 deg.; Extension= 0 deg.; Ext. Rot.= 45 deg.;\par Int. Rot.= 10 deg.; Abduction= 45 deg.; Adduction= 20 deg.\par \par Strength: Hip Flexor/Extensor St.= Left: 3-4/5 ; Right: 5/5\par There is mild pain on ROM endpoints. \par There is mild stiffness on ROM endpoints.\par Stability: No gross klunk/instability with ROM bilat. Hips.\par Incision/scar: 0\par \par Left Knee:\par Swelling: deformity of OA\par Effusion: 0\par Alignment:  Varus  \par Tenderness: medial, post.\par Incision: 0\par Skin Temp: Normal\par ROM: Flexion: [120] deg.; Extension: [-10] deg,\par Laxity A-P:  0 \par Laxity M-L:   Severe > 5mm\par PF crepitus: 2+ ; with  pain\par Quadricep formation:  attenuated in Extension & Flexion:\par Quad/Ham St: 3/5\par \par Right  Knee:\par Swellin\par Effusion: 0\par Alignment:  Varus \par Tenderness: 0\par Incision: 0\par Skin Temp: NOrmal\par ROM: Flexion: [125] deg.; Extension: [-5] deg,\par Laxity A-P:  0 \par Laxity M-L:   Mod 5 mm \par PF crepitus: 1+ ; without pain\par Quadricep formation: attenuated in Extension & Flexion:\par Quad/Ham St: 4/5\par \par  [de-identified] : Radiographs of the pelvis and [LEft ] hip 6/13/19 reviewed with patient \par Radiographs of the LEft knee 5/18/20 reviewed with the patient.\par She decline new XRays in office today.

## 2021-03-16 RX ORDER — HYALURONATE SODIUM 20 MG/2 ML
20 SYRINGE (ML) INTRAARTICULAR
Qty: 1 | Refills: 0 | Status: ACTIVE | OUTPATIENT
Start: 2021-01-08

## 2021-03-22 ENCOUNTER — APPOINTMENT (OUTPATIENT)
Dept: ULTRASOUND IMAGING | Facility: CLINIC | Age: 61
End: 2021-03-22
Payer: MEDICAID

## 2021-03-22 ENCOUNTER — OUTPATIENT (OUTPATIENT)
Dept: OUTPATIENT SERVICES | Facility: HOSPITAL | Age: 61
LOS: 1 days | End: 2021-03-22

## 2021-03-22 ENCOUNTER — RESULT REVIEW (OUTPATIENT)
Age: 61
End: 2021-03-22

## 2021-03-22 DIAGNOSIS — M25.552 PAIN IN LEFT HIP: ICD-10-CM

## 2021-03-22 PROCEDURE — 20611 DRAIN/INJ JOINT/BURSA W/US: CPT | Mod: LT

## 2021-03-30 ENCOUNTER — APPOINTMENT (OUTPATIENT)
Dept: ORTHOPEDIC SURGERY | Facility: CLINIC | Age: 61
End: 2021-03-30
Payer: MEDICAID

## 2021-03-30 VITALS — HEART RATE: 66 BPM | SYSTOLIC BLOOD PRESSURE: 169 MMHG | DIASTOLIC BLOOD PRESSURE: 84 MMHG | TEMPERATURE: 97.6 F

## 2021-03-30 PROCEDURE — 99072 ADDL SUPL MATRL&STAF TM PHE: CPT

## 2021-03-30 PROCEDURE — 20610 DRAIN/INJ JOINT/BURSA W/O US: CPT | Mod: LT

## 2021-03-30 RX ORDER — METFORMIN HYDROCHLORIDE 500 MG/1
500 TABLET, COATED ORAL
Qty: 30 | Refills: 0 | Status: ACTIVE | COMMUNITY
Start: 2021-01-05

## 2021-03-30 RX ORDER — ERGOCALCIFEROL 1.25 MG/1
1.25 MG CAPSULE, LIQUID FILLED ORAL
Qty: 4 | Refills: 0 | Status: ACTIVE | COMMUNITY
Start: 2021-01-05

## 2021-03-30 RX ADMIN — LIDOCAINE HYDROCHLORIDE 5 %: 10 INJECTION, SOLUTION INFILTRATION; PERINEURAL at 00:00

## 2021-03-30 RX ADMIN — Medication 2 MG/2ML: at 00:00

## 2021-04-01 RX ORDER — HYALURONATE SODIUM 20 MG/2 ML
20 SYRINGE (ML) INTRAARTICULAR
Refills: 0 | Status: COMPLETED | OUTPATIENT
Start: 2021-03-30

## 2021-04-01 RX ORDER — LIDOCAINE HYDROCHLORIDE 10 MG/ML
1 INJECTION, SOLUTION INFILTRATION; PERINEURAL
Refills: 0 | Status: COMPLETED | OUTPATIENT
Start: 2021-03-30

## 2021-04-06 ENCOUNTER — APPOINTMENT (OUTPATIENT)
Dept: ORTHOPEDIC SURGERY | Facility: CLINIC | Age: 61
End: 2021-04-06
Payer: MEDICAID

## 2021-04-06 VITALS — DIASTOLIC BLOOD PRESSURE: 79 MMHG | HEART RATE: 61 BPM | TEMPERATURE: 97.3 F | SYSTOLIC BLOOD PRESSURE: 120 MMHG

## 2021-04-06 DIAGNOSIS — M25.562 PAIN IN LEFT KNEE: ICD-10-CM

## 2021-04-06 PROCEDURE — 20610 DRAIN/INJ JOINT/BURSA W/O US: CPT | Mod: LT

## 2021-04-06 PROCEDURE — 99072 ADDL SUPL MATRL&STAF TM PHE: CPT

## 2021-04-06 RX ADMIN — Medication 2 MG/2ML: at 00:00

## 2021-04-06 RX ADMIN — LIDOCAINE HYDROCHLORIDE 3 %: 10 INJECTION, SOLUTION INFILTRATION; PERINEURAL at 00:00

## 2021-04-06 NOTE — REASON FOR VISIT
[Follow-Up Visit] : a follow-up visit for [Knee Pain] : knee pain [Osteoarthritis, Knee] : osteoarthritis, knee [FreeTextEntry2] : Lt knee osteoarthritis Euflexxa #2 pain denied

## 2021-04-06 NOTE — HISTORY OF PRESENT ILLNESS
[de-identified] : 60-year-old female presents for her second in flexor injection. After her first her first injection she did develop some bruising at the injection site initially she did have some acute pain but that resolved. She denies fever chills or night sweats has no paresthesia motion and no other orthopedic complaints.

## 2021-04-06 NOTE — DISCUSSION/SUMMARY
[Medication Risks Reviewed] : Medication risks reviewed [de-identified] : Left knee osteoarthritis\par Left knee pain\par \par \par \par Patient presents for a second flexor injection. Deep injection was placed in the lateral compartment this time. Postinjection instructions were given we'll see her back in one week for her third and final injection.

## 2021-04-06 NOTE — PHYSICAL EXAM
[Antalgic] : antalgic [LE] : Sensory: Intact in bilateral lower extremities [DP] : dorsalis pedis 2+ and symmetric bilaterally [PT] : posterior tibial 2+ and symmetric bilaterally [Normal] : no peripheral adenopathy appreciated [de-identified] : Examination of her left knee: the skin is warm and dry there is some ecchymosis noted over the anterior aspect of the knee well last injection was given this area slightly tender. there is a positive varus deformity there is medial lateral joint tenderness and tenderness of the medial lateral patella facets range of motion is 0-120° of flexion ligament exam stable to varus valgus stress  [de-identified] : No new images were obtained today.

## 2021-04-06 NOTE — PROCEDURE
[Injection] : Injection [Left] : of the left [Knee Joint] : knee joint [Osteoarthritis] : Osteoarthritis [Inflammation] : Inflammation [Joint Pain] : Joint pain [Patient] : patient [Risk] : risk [Benefits] : benefits [Alternatives] : alternatives [Bleeding] : bleeding [Infection] : infection [Allergic Reaction] : allergic reaction [Verbal Consent Obtained] : verbal consent was obtained prior to the procedure [Ethyl Chloride Spray] : ethyl chloride spray was used as a topical anesthetic [___mL] : [unfilled] ~UmL of lidocaine [1%] : 1% lidocaine [Without Epi] : without epinephrine [Lateral] : lateral [Anterior] : anterior [22] : a 22-gauge [1.5  inch] : 1.5 inch needle [1% Lidocaine___(mL)] : [unfilled] mL of 1% Lidocaine [2 mL Euflexxa___(lot #)] : 2mL Euflexxa ~Ulot# [unfilled] [Bandage Applied] : a bandage [Tolerated Well] : The patient tolerated the procedure well [None] : none [No Strenuous Activity___day(s)] : avoid strenuous activity for [unfilled] day(s) [PRN] : as needed [FreeTextEntry1] : chlorahexadine

## 2021-04-07 RX ORDER — LIDOCAINE HYDROCHLORIDE 10 MG/ML
1 INJECTION, SOLUTION INFILTRATION; PERINEURAL
Refills: 0 | Status: COMPLETED | OUTPATIENT
Start: 2021-04-06

## 2021-04-07 RX ORDER — HYALURONATE SODIUM 20 MG/2 ML
20 SYRINGE (ML) INTRAARTICULAR
Refills: 0 | Status: COMPLETED | OUTPATIENT
Start: 2021-04-06

## 2021-04-13 ENCOUNTER — APPOINTMENT (OUTPATIENT)
Dept: ORTHOPEDIC SURGERY | Facility: CLINIC | Age: 61
End: 2021-04-13
Payer: MEDICAID

## 2021-04-13 VITALS — TEMPERATURE: 97 F | DIASTOLIC BLOOD PRESSURE: 72 MMHG | HEART RATE: 66 BPM | SYSTOLIC BLOOD PRESSURE: 118 MMHG

## 2021-04-13 PROCEDURE — 99072 ADDL SUPL MATRL&STAF TM PHE: CPT

## 2021-04-13 PROCEDURE — 20610 DRAIN/INJ JOINT/BURSA W/O US: CPT | Mod: LT

## 2021-04-13 RX ORDER — HYALURONATE SODIUM 20 MG/2 ML
20 SYRINGE (ML) INTRAARTICULAR
Refills: 0 | Status: COMPLETED | OUTPATIENT
Start: 2021-04-13

## 2021-04-13 RX ORDER — LIDOCAINE HYDROCHLORIDE 10 MG/ML
1 INJECTION, SOLUTION INFILTRATION; PERINEURAL
Refills: 0 | Status: COMPLETED | OUTPATIENT
Start: 2021-04-13

## 2021-04-13 RX ADMIN — Medication 2 MG/2ML: at 00:00

## 2021-04-13 RX ADMIN — LIDOCAINE HYDROCHLORIDE 3 %: 10 INJECTION, SOLUTION INFILTRATION; PERINEURAL at 00:00

## 2021-04-20 RX ORDER — HYALURONATE SODIUM 20 MG/2 ML
20 SYRINGE (ML) INTRAARTICULAR
Qty: 3 | Refills: 0 | Status: ACTIVE | OUTPATIENT
Start: 2021-04-13

## 2021-05-18 ENCOUNTER — APPOINTMENT (OUTPATIENT)
Dept: ORTHOPEDIC SURGERY | Facility: CLINIC | Age: 61
End: 2021-05-18
Payer: MEDICAID

## 2021-05-18 VITALS — HEART RATE: 67 BPM | TEMPERATURE: 97.1 F | DIASTOLIC BLOOD PRESSURE: 85 MMHG | SYSTOLIC BLOOD PRESSURE: 137 MMHG

## 2021-05-18 PROCEDURE — 20610 DRAIN/INJ JOINT/BURSA W/O US: CPT | Mod: RT

## 2021-05-18 PROCEDURE — 99072 ADDL SUPL MATRL&STAF TM PHE: CPT

## 2021-05-18 RX ORDER — LIDOCAINE HYDROCHLORIDE 10 MG/ML
1 INJECTION, SOLUTION INFILTRATION; PERINEURAL
Refills: 0 | Status: COMPLETED | OUTPATIENT
Start: 2021-05-18

## 2021-05-18 RX ORDER — DICLOFENAC SODIUM 1% 10 MG/G
1 GEL TOPICAL
Qty: 100 | Refills: 0 | Status: ACTIVE | COMMUNITY
Start: 2021-04-02

## 2021-05-18 RX ORDER — HYALURONATE SODIUM 20 MG/2 ML
20 SYRINGE (ML) INTRAARTICULAR
Refills: 0 | Status: COMPLETED | OUTPATIENT
Start: 2021-05-18

## 2021-05-18 RX ADMIN — LIDOCAINE HYDROCHLORIDE 3 %: 10 INJECTION, SOLUTION INFILTRATION; PERINEURAL at 00:00

## 2021-05-18 RX ADMIN — METHYLPREDNISOLONE ACETATE 2 MG/2ML: 40 INJECTION, SUSPENSION INTRALESIONAL; INTRAMUSCULAR; INTRASYNOVIAL; SOFT TISSUE at 00:00

## 2021-05-25 ENCOUNTER — APPOINTMENT (OUTPATIENT)
Dept: ORTHOPEDIC SURGERY | Facility: CLINIC | Age: 61
End: 2021-05-25
Payer: MEDICAID

## 2021-05-25 VITALS — HEART RATE: 68 BPM | SYSTOLIC BLOOD PRESSURE: 130 MMHG | DIASTOLIC BLOOD PRESSURE: 82 MMHG | TEMPERATURE: 97 F

## 2021-05-25 PROCEDURE — 20610 DRAIN/INJ JOINT/BURSA W/O US: CPT | Mod: RT

## 2021-05-25 PROCEDURE — 99072 ADDL SUPL MATRL&STAF TM PHE: CPT

## 2021-05-25 RX ADMIN — Medication 2 MG/2ML: at 00:00

## 2021-05-25 RX ADMIN — LIDOCAINE HYDROCHLORIDE 3 %: 10 INJECTION, SOLUTION INFILTRATION; PERINEURAL at 00:00

## 2021-05-27 RX ORDER — HYALURONATE SODIUM 20 MG/2 ML
20 SYRINGE (ML) INTRAARTICULAR
Refills: 0 | Status: COMPLETED | OUTPATIENT
Start: 2021-05-25

## 2021-05-27 RX ORDER — LIDOCAINE HYDROCHLORIDE 10 MG/ML
1 INJECTION, SOLUTION INFILTRATION; PERINEURAL
Refills: 0 | Status: COMPLETED | OUTPATIENT
Start: 2021-05-25

## 2021-06-01 ENCOUNTER — APPOINTMENT (OUTPATIENT)
Dept: ORTHOPEDIC SURGERY | Facility: CLINIC | Age: 61
End: 2021-06-01
Payer: MEDICAID

## 2021-06-01 VITALS — TEMPERATURE: 96.9 F | DIASTOLIC BLOOD PRESSURE: 83 MMHG | SYSTOLIC BLOOD PRESSURE: 134 MMHG | HEART RATE: 61 BPM

## 2021-06-01 PROCEDURE — 99072 ADDL SUPL MATRL&STAF TM PHE: CPT

## 2021-06-01 PROCEDURE — 20610 DRAIN/INJ JOINT/BURSA W/O US: CPT | Mod: RT

## 2021-06-01 RX ADMIN — Medication 2 MG/2ML: at 00:00

## 2021-06-01 RX ADMIN — LIDOCAINE HYDROCHLORIDE 3 %: 10 INJECTION, SOLUTION INFILTRATION; PERINEURAL at 00:00

## 2021-06-03 RX ORDER — LIDOCAINE HYDROCHLORIDE 10 MG/ML
1 INJECTION, SOLUTION INFILTRATION; PERINEURAL
Refills: 0 | Status: COMPLETED | OUTPATIENT
Start: 2021-06-01

## 2021-06-03 RX ORDER — HYALURONATE SODIUM 20 MG/2 ML
20 SYRINGE (ML) INTRAARTICULAR
Refills: 0 | Status: COMPLETED | OUTPATIENT
Start: 2021-06-01

## 2021-10-22 ENCOUNTER — APPOINTMENT (OUTPATIENT)
Dept: ORTHOPEDIC SURGERY | Facility: CLINIC | Age: 61
End: 2021-10-22
Payer: MEDICAID

## 2021-10-22 VITALS
SYSTOLIC BLOOD PRESSURE: 137 MMHG | DIASTOLIC BLOOD PRESSURE: 80 MMHG | HEART RATE: 63 BPM | BODY MASS INDEX: 36.88 KG/M2 | WEIGHT: 216 LBS | HEIGHT: 64 IN

## 2021-10-22 PROCEDURE — 99213 OFFICE O/P EST LOW 20 MIN: CPT

## 2021-10-22 PROCEDURE — 73502 X-RAY EXAM HIP UNI 2-3 VIEWS: CPT | Mod: LT

## 2021-10-22 PROCEDURE — 73562 X-RAY EXAM OF KNEE 3: CPT | Mod: RT

## 2021-10-22 RX ORDER — HYALURONATE SODIUM 20 MG/2 ML
20 SYRINGE (ML) INTRAARTICULAR
Qty: 2 | Refills: 0 | Status: ACTIVE | OUTPATIENT
Start: 2021-10-22

## 2021-10-22 NOTE — HISTORY OF PRESENT ILLNESS
[de-identified] : Patient presents today for reevaluation of left hip and bilateral knee pain.  The patient had previously had an intra-articular corticosteroid injection of the left hip back in March.  She states she is noting increased left hip pain.  She would like to repeat the procedure in the near future to mitigate her left hip pain from osteoarthritis.  She reports of known stiffness of the left hip.  She does limp at times.  She is also here today for bilateral knee pain.  The patient is known to have arthritic changes of both knees.  She has had success with Euflexxa injection.  She would like to repeat that soon.  She is not using a cane or a walker.  No other related complaints.\par \par Review of Systems-\par Constitutional: No fever or chills. \par Cardiovascular: No orthopnea or chest pain\par Pulmonary: No shortness of breath. \par GI: No nausea or vomiting or abdominal pain.\par Musculoskeletal: see HPI \par Psychiatric: No anxiety and depression.

## 2021-10-22 NOTE — PHYSICAL EXAM
[de-identified] : The patient appears well nourished and in no apparent distress. The patient is alert and oriented to person, place, and time. Affect and mood appear normal. The head is normocephalic and atraumatic. Skin shows normal turgor with no evidence of eczema or psoriasis. No respiratory distress noted. Sensation grossly intact. MUSCULOSKELETAL:   SEE BELOW\par \par Left hip exam demonstrates equal leg lengths with the right.  Internal rotation to neutral.  External rotation of 40 degrees.  Hip abduction is 10 degrees.  Hip abduction is approximately 25 degrees.  Hip flexion is approximately 60 degrees.  Straight leg raise is 50 degrees.  No calf tenderness.  Normal sensation to light touch.\par \par Bilateral knee exam demonstrates mild varus alignment bilaterally of less than 5 degrees.  Both knees have full extension.  Flexion of the right is approximately 105 degrees and flexion of the left is approximately 100 degrees.  Bilateral anterior knee patellofemoral crepitus which is mildly symptomatic.  Minimal instability is noted bilaterally.  No extensor mechanism leg.  No flexion contracture. [de-identified] : Pelvis and left hip x-rays were reviewed.  The left hip x-ray demonstrates degenerative changes consistent with past x-rays.  No significant changes from previous x-ray.  Osteophyte formation is noted.\par \par Bilateral knee x-rays reviewed.  Severe medial compartment degenerative changes noted bilaterally.  Diffuse osteophyte formation.  Advanced degenerative changes the patellofemoral joint.

## 2021-10-22 NOTE — DISCUSSION/SUMMARY
[de-identified] : More than 25 minutes was spent reviewing the x-rays as well as discussing with the patient their clinical presentation, diagnosis and providing education.  The patient was shown the advanced generative changes of the left hip and both knees.  She would like to repeat the intra-articular corticosteroid injection of the left hip.  A prescription is provided.  Additionally, the patient would like to repeat the Euflexxa injection series of both of her knees.  She reports this provided sustained relief.  The injections will be ordered.  She will be seen back at time of receipt of the medication.  She does understand that if these modalities do not improve her quality of life, she will be indicated for joint replacement surgery.  All questions were answered to the patient's satisfaction.

## 2021-10-31 ENCOUNTER — NON-APPOINTMENT (OUTPATIENT)
Age: 61
End: 2021-10-31

## 2021-11-04 ENCOUNTER — APPOINTMENT (OUTPATIENT)
Dept: ORTHOPEDIC SURGERY | Facility: CLINIC | Age: 61
End: 2021-11-04
Payer: MEDICAID

## 2021-11-04 VITALS — DIASTOLIC BLOOD PRESSURE: 83 MMHG | HEART RATE: 65 BPM | SYSTOLIC BLOOD PRESSURE: 132 MMHG

## 2021-11-04 PROCEDURE — 20610 DRAIN/INJ JOINT/BURSA W/O US: CPT | Mod: 50

## 2021-11-04 RX ORDER — HYALURONATE SODIUM 20 MG/2 ML
20 SYRINGE (ML) INTRAARTICULAR
Refills: 0 | Status: COMPLETED | OUTPATIENT
Start: 2021-11-04

## 2021-11-04 RX ORDER — LIDOCAINE HYDROCHLORIDE 10 MG/ML
1 INJECTION, SOLUTION INFILTRATION; PERINEURAL
Refills: 0 | Status: COMPLETED | OUTPATIENT
Start: 2021-11-04

## 2021-11-04 RX ADMIN — LIDOCAINE HYDROCHLORIDE 3 %: 10 INJECTION, SOLUTION INFILTRATION; PERINEURAL at 00:00

## 2021-11-04 RX ADMIN — Medication 2 MG/2ML: at 00:00

## 2021-11-04 NOTE — HISTORY OF PRESENT ILLNESS
[Worsening] : worsening [Standing] : standing [Constant] : ~He/She~ states the symptoms seem to be constant [Bending] : worsened by bending [Walking] : worsened by walking [Ice] : relieved by ice [NSAIDs] : relieved by nonsteroidal anti-inflammatory drugs [Rest] : relieved by rest [de-identified] : Patient is a 61-year-old female being seen today for her first Euflexxa injection to bilateral knees.  She is experiencing osteoarthritic symptoms and has been evaluated by Dr. Ferro and was indicated for viscosupplementation.\par The patient admits to an allergy to Zithromax and has no allergies to chicken eggs or feathers.  Thank you the patient was identified by name and date of birth and confirms that we are treating both her right and left knee today.\par The patient reports her pain to the right knee is a 4 out of 10 and to the left knee a 7 out of 10.  She does experience  buckling and locking up of the right knee

## 2021-11-04 NOTE — DISCUSSION/SUMMARY
[Medication Risks Reviewed] : Medication risks reviewed [de-identified] : The patient was advised of the activities for today.  She was given instruction on post injection ice and analgesia.  The patient had her questions answered and gave a clear understanding of the instructions.  She was advised she may call the office at anytime with any questions or concerns.  The patient will follow up in 1 week for her second Euflexxa injection to both the right and left knee.

## 2021-11-04 NOTE — REASON FOR VISIT
[Follow-Up Visit] : a follow-up visit for [Osteoarthritis, Knee] : osteoarthritis, knee [FreeTextEntry2] : Bilateral knee osteoarthritis Today #1 Euflexxa BL  pain Rt 4/10 Lt 7/10 buckling and locking

## 2021-11-04 NOTE — PROCEDURE
[Injection] : Injection [Bilateral] : of bilateral [Osteoarthritis] : Osteoarthritis [Joint Pain] : Joint pain [Patient] : patient [Risk] : risk [Benefits] : benefits [Bleeding] : bleeding [Infection] : infection [Verbal Consent Obtained] : verbal consent was obtained prior to the procedure [Ethyl Chloride Spray] : ethyl chloride spray was used as a topical anesthetic [Lateral] : lateral [22] : a 22-gauge [2 mL Euflexxa___(lot #)] : 2mL Euflexxa ~Ulot# [unfilled] [Bandage Applied] : a bandage [Tolerated Well] : The patient tolerated the procedure well [None] : none [___ Week(s)] : in [unfilled] week(s) [FreeTextEntry1] : Chlorhexidine

## 2021-11-12 ENCOUNTER — APPOINTMENT (OUTPATIENT)
Dept: ORTHOPEDIC SURGERY | Facility: CLINIC | Age: 61
End: 2021-11-12
Payer: MEDICAID

## 2021-11-12 ENCOUNTER — NON-APPOINTMENT (OUTPATIENT)
Age: 61
End: 2021-11-12

## 2021-11-12 VITALS — DIASTOLIC BLOOD PRESSURE: 83 MMHG | HEART RATE: 65 BPM | SYSTOLIC BLOOD PRESSURE: 132 MMHG

## 2021-11-12 DIAGNOSIS — M16.12 UNILATERAL PRIMARY OSTEOARTHRITIS, LEFT HIP: ICD-10-CM

## 2021-11-12 PROCEDURE — 20610 DRAIN/INJ JOINT/BURSA W/O US: CPT | Mod: 50

## 2021-11-12 RX ORDER — HYALURONATE SODIUM 20 MG/2 ML
20 SYRINGE (ML) INTRAARTICULAR
Refills: 0 | Status: COMPLETED | OUTPATIENT
Start: 2021-11-12

## 2021-11-12 RX ORDER — LIDOCAINE HYDROCHLORIDE 10 MG/ML
1 INJECTION, SOLUTION INFILTRATION; PERINEURAL
Refills: 0 | Status: COMPLETED | OUTPATIENT
Start: 2021-11-12

## 2021-11-12 RX ADMIN — LIDOCAINE HYDROCHLORIDE 3 %: 10 INJECTION, SOLUTION INFILTRATION; PERINEURAL at 00:00

## 2021-11-12 RX ADMIN — Medication 2 MG/2ML: at 00:00

## 2021-11-19 ENCOUNTER — APPOINTMENT (OUTPATIENT)
Dept: ORTHOPEDIC SURGERY | Facility: CLINIC | Age: 61
End: 2021-11-19
Payer: MEDICAID

## 2021-11-19 VITALS
HEIGHT: 64 IN | WEIGHT: 216 LBS | DIASTOLIC BLOOD PRESSURE: 82 MMHG | SYSTOLIC BLOOD PRESSURE: 134 MMHG | BODY MASS INDEX: 36.88 KG/M2 | HEART RATE: 75 BPM

## 2021-11-19 DIAGNOSIS — M17.11 UNILATERAL PRIMARY OSTEOARTHRITIS, RIGHT KNEE: ICD-10-CM

## 2021-11-19 DIAGNOSIS — M17.12 UNILATERAL PRIMARY OSTEOARTHRITIS, LEFT KNEE: ICD-10-CM

## 2021-11-19 PROCEDURE — 20610 DRAIN/INJ JOINT/BURSA W/O US: CPT | Mod: 50

## 2021-11-19 PROCEDURE — 99211 OFF/OP EST MAY X REQ PHY/QHP: CPT | Mod: 25

## 2021-11-19 NOTE — DISCUSSION/SUMMARY
[Medication Risks Reviewed] : Medication risks reviewed [de-identified] : Bilateral knee osteoarthritis\par Bilateral knee pain\par \par \par \par \par Patient presents for her second euflexa injection in both knees.  Risks benefits alternatives his injection reviewed the patient was injection instructions were given the patient will continue with a home stretching strengthening program.Also give the patient prescription for meloxicam. The proper use of his medications also possible side effects were reviewed the patient. We'll see the patient back as needed.

## 2021-11-19 NOTE — HISTORY OF PRESENT ILLNESS
[de-identified] : 61-year-old female presents for followup for bilateral knee osteoarthritis and pain and for her third and final euflexor injection in both knees. She seen some improvement after the first 2 injections she does continue to have pain worse with activity ascending descending stairs is going to use Motrin stimulation. She denies fever chills night sweats has no paresthesia no extremity no other orthopedic complaints and she is in her normal state of health.

## 2021-11-19 NOTE — PHYSICAL EXAM
[LE] : Sensory: Intact in bilateral lower extremities [DP] : dorsalis pedis 2+ and symmetric bilaterally [PT] : posterior tibial 2+ and symmetric bilaterally [Obese] : obese [Normal] : no peripheral adenopathy appreciated [de-identified] : Bilateral knee exam demonstrates mild varus alignment bilaterally of less than 5 degrees. Both knees have full extension. Flexion of the right is approximately 105 degrees and flexion of the left is approximately 100 degrees. Bilateral anterior knee patellofemoral crepitus which is mildly symptomatic. Minimal instability is noted bilaterally. No extensor mechanism leg. No flexion contracture. \par \par

## 2021-11-19 NOTE — PROCEDURE
[Injection] : Injection [Bilateral] : of bilateral [Knee Joint] : knee joint [Osteoarthritis] : Osteoarthritis [Inflammation] : Inflammation [Diagnostic] : Diagnostic [Joint Pain] : Joint pain [Patient] : patient [Risk] : risk [Benefits] : benefits [Alternatives] : alternatives [Bleeding] : bleeding [Infection] : infection [Allergic Reaction] : allergic reaction [Verbal Consent Obtained] : verbal consent was obtained prior to the procedure [Ethyl Chloride Spray] : ethyl chloride spray was used as a topical anesthetic [___mL] : [unfilled] ~UmL of lidocaine [1%] : 1% lidocaine [Without Epi] : without epinephrine [Medial] : medial [Anterior] : anterior [2 mL Euflexxa___(lot #)] : 2mL Euflexxa ~Ulot# [unfilled] [Bandage Applied] : a bandage [Tolerated Well] : The patient tolerated the procedure well [None] : none [No Strenuous Activity___day(s)] : avoid strenuous activity for [unfilled] day(s) [PRN] : as needed [FreeTextEntry1] : chlorahexadine

## 2021-11-29 ENCOUNTER — APPOINTMENT (OUTPATIENT)
Dept: ULTRASOUND IMAGING | Facility: CLINIC | Age: 61
End: 2021-11-29

## 2022-03-03 ENCOUNTER — RX RENEWAL (OUTPATIENT)
Age: 62
End: 2022-03-03

## 2022-03-03 RX ORDER — MELOXICAM 15 MG/1
15 TABLET ORAL DAILY
Qty: 21 | Refills: 1 | Status: ACTIVE | COMMUNITY
Start: 2021-11-19 | End: 1900-01-01